# Patient Record
Sex: FEMALE | HISPANIC OR LATINO | Employment: UNEMPLOYED | URBAN - METROPOLITAN AREA
[De-identification: names, ages, dates, MRNs, and addresses within clinical notes are randomized per-mention and may not be internally consistent; named-entity substitution may affect disease eponyms.]

---

## 2019-01-25 ENCOUNTER — HOSPITAL ENCOUNTER (EMERGENCY)
Facility: HOSPITAL | Age: 49
Discharge: HOME | End: 2019-01-25
Attending: EMERGENCY MEDICINE | Admitting: EMERGENCY MEDICINE

## 2019-01-25 VITALS
HEART RATE: 74 BPM | WEIGHT: 179.01 LBS | DIASTOLIC BLOOD PRESSURE: 98 MMHG | RESPIRATION RATE: 18 BRPM | OXYGEN SATURATION: 100 % | TEMPERATURE: 97.9 F | SYSTOLIC BLOOD PRESSURE: 161 MMHG

## 2019-01-25 DIAGNOSIS — I10 BENIGN LABILE HYPERTENSION: ICD-10-CM

## 2019-01-25 DIAGNOSIS — F41.9 ANXIETY: Primary | ICD-10-CM

## 2019-01-25 DIAGNOSIS — Z91.19 MEDICALLY NONCOMPLIANT: ICD-10-CM

## 2019-01-25 LAB
ALBUMIN SERPL BCP-MCNC: 4 G/DL (ref 3–5.2)
ALP SERPL-CCNC: 71 U/L (ref 43–122)
ALT SERPL W P-5'-P-CCNC: 20 U/L (ref 9–52)
ANION GAP SERPL CALCULATED.3IONS-SCNC: 4 MMOL/L (ref 5–14)
AST SERPL W P-5'-P-CCNC: 22 U/L (ref 14–36)
ATRIAL RATE: 81 BPM
BASOPHILS # BLD AUTO: 0.1 THOUSANDS/ΜL (ref 0–0.1)
BASOPHILS NFR BLD AUTO: 1 % (ref 0–1)
BILIRUB SERPL-MCNC: 0.3 MG/DL
BUN SERPL-MCNC: 7 MG/DL (ref 5–25)
CALCIUM SERPL-MCNC: 9.5 MG/DL (ref 8.4–10.2)
CHLORIDE SERPL-SCNC: 102 MMOL/L (ref 97–108)
CO2 SERPL-SCNC: 29 MMOL/L (ref 22–30)
CREAT SERPL-MCNC: 0.54 MG/DL (ref 0.6–1.2)
EOSINOPHIL # BLD AUTO: 0.1 THOUSAND/ΜL (ref 0–0.4)
EOSINOPHIL NFR BLD AUTO: 1 % (ref 0–6)
ERYTHROCYTE [DISTWIDTH] IN BLOOD BY AUTOMATED COUNT: 13.9 %
GFR SERPL CREATININE-BSD FRML MDRD: 111 ML/MIN/1.73SQ M
GLUCOSE SERPL-MCNC: 118 MG/DL (ref 70–99)
HCT VFR BLD AUTO: 41.6 % (ref 36–46)
HGB BLD-MCNC: 13.6 G/DL (ref 12–16)
LYMPHOCYTES # BLD AUTO: 2.3 THOUSANDS/ΜL (ref 0.5–4)
LYMPHOCYTES NFR BLD AUTO: 24 % (ref 25–45)
MCH RBC QN AUTO: 27.6 PG (ref 26–34)
MCHC RBC AUTO-ENTMCNC: 32.6 G/DL (ref 31–36)
MCV RBC AUTO: 85 FL (ref 80–100)
MONOCYTES # BLD AUTO: 0.4 THOUSAND/ΜL (ref 0.2–0.9)
MONOCYTES NFR BLD AUTO: 4 % (ref 1–10)
NEUTROPHILS # BLD AUTO: 6.6 THOUSANDS/ΜL (ref 1.8–7.8)
NEUTS SEG NFR BLD AUTO: 70 % (ref 45–65)
P AXIS: 30 DEGREES
PLATELET # BLD AUTO: 359 THOUSANDS/UL (ref 150–450)
PMV BLD AUTO: 8.7 FL (ref 8.9–12.7)
POTASSIUM SERPL-SCNC: 3.6 MMOL/L (ref 3.6–5)
PR INTERVAL: 172 MS
PROT SERPL-MCNC: 7.3 G/DL (ref 5.9–8.4)
QRS AXIS: 8 DEGREES
QRSD INTERVAL: 88 MS
QT INTERVAL: 350 MS
QTC INTERVAL: 406 MS
RBC # BLD AUTO: 4.91 MILLION/UL (ref 4–5.2)
SODIUM SERPL-SCNC: 135 MMOL/L (ref 137–147)
T WAVE AXIS: 15 DEGREES
TROPONIN I SERPL-MCNC: <0.01 NG/ML (ref 0–0.03)
TSH SERPL DL<=0.05 MIU/L-ACNC: 2.92 UIU/ML (ref 0.47–4.68)
VENTRICULAR RATE: 81 BPM
WBC # BLD AUTO: 9.5 THOUSAND/UL (ref 4.5–11)

## 2019-01-25 PROCEDURE — 36415 COLL VENOUS BLD VENIPUNCTURE: CPT | Performed by: EMERGENCY MEDICINE

## 2019-01-25 PROCEDURE — 80053 COMPREHEN METABOLIC PANEL: CPT | Performed by: EMERGENCY MEDICINE

## 2019-01-25 PROCEDURE — 84484 ASSAY OF TROPONIN QUANT: CPT | Performed by: EMERGENCY MEDICINE

## 2019-01-25 PROCEDURE — 93010 ELECTROCARDIOGRAM REPORT: CPT | Performed by: INTERNAL MEDICINE

## 2019-01-25 PROCEDURE — 85025 COMPLETE CBC W/AUTO DIFF WBC: CPT | Performed by: EMERGENCY MEDICINE

## 2019-01-25 PROCEDURE — 93005 ELECTROCARDIOGRAM TRACING: CPT

## 2019-01-25 PROCEDURE — 99283 EMERGENCY DEPT VISIT LOW MDM: CPT

## 2019-01-25 PROCEDURE — 84443 ASSAY THYROID STIM HORMONE: CPT | Performed by: EMERGENCY MEDICINE

## 2019-01-25 RX ORDER — METOPROLOL TARTRATE 5 MG/5ML
5 INJECTION INTRAVENOUS ONCE
Status: DISCONTINUED | OUTPATIENT
Start: 2019-01-25 | End: 2019-01-25

## 2019-01-25 RX ORDER — LORAZEPAM 0.5 MG/1
0.5 TABLET ORAL ONCE
Status: COMPLETED | OUTPATIENT
Start: 2019-01-25 | End: 2019-01-25

## 2019-01-25 RX ORDER — LISINOPRIL 10 MG/1
10 TABLET ORAL ONCE
Status: COMPLETED | OUTPATIENT
Start: 2019-01-25 | End: 2019-01-25

## 2019-01-25 RX ORDER — LISINOPRIL 10 MG/1
10 TABLET ORAL DAILY
Qty: 14 TABLET | Refills: 0 | Status: SHIPPED | OUTPATIENT
Start: 2019-01-25

## 2019-01-25 RX ADMIN — LORAZEPAM 0.5 MG: 0.5 TABLET ORAL at 01:40

## 2019-01-25 RX ADMIN — LISINOPRIL 10 MG: 10 TABLET ORAL at 02:33
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
## 2019-01-25 NOTE — DISCHARGE INSTRUCTIONS
Ansiedad   LO QUE NECESITA SABER:   La ansiedad es don afección que provoca que usted se sienta extremadamente preocupado o nervioso  Los sentimientos son mena kindra que pueden causar problemas en rebecca actividades diarias o el sueño  La ansiedad puede desencadenarse por algo que teme o puede ocurrir sin don causa  El estrés familiar o laboral, fumar, la cafeína y el alcohol pueden aumentar hugo riesgo para sufrir ansiedad  Ciertos medicamentos o condiciones de [address] pueden aumentar hugo riesgo  La ansiedad puede convertirse en don afección de larga duración si no se controla ni se trata  INSTRUCCIONES SOBRE EL [facility]PITAL[facility]IA:   Llame al 911 si presenta:   · Usted tiene dolor de pecho, presión o pesadez que pueden llegar a propagarse a rebecca hombros, brazos, mandíbula, savi o espalda    · Usted siente deseos de lastimarse o lastimar a alguien más  Pregúntele a hugo Rogue Beady vitaminas y minerales son adecuados para usted  · Rebecca síntomas empeoran o no mejoran con el tratamiento  · Hugo ansiedad christina que realice rebecca actividades diarias  · Tiene nuevos síntomas desde hugo última consulta  · Usted tiene preguntas o inquietudes acerca de hugo condición o cuidado  Medicamentos:   · Medicamentos,  para ayudarle a sentirse más calmado y relajado y disminuir rebecca síntomas  · [place] rebecca medicamentos thea se le haya indicado  Consulte con hugo médico si usted jyoti que hugo medicamento no le está ayudando o si presenta efectos secundarios  Infórmele si es alérgico a algún medicamento  Mantenga don lista actualizada de los [place], las vitaminas y los productos herbales que otf  Incluya los siguientes datos de los medicamentos: cantidad, frecuencia y motivo de administración  Traiga con usted la lista o los envases de la píldoras a rebecca citas de seguimiento  Lleve la lista de los medicamentos con usted en andres de don emergencia    Programe don aicha con hugo médico dentro de 2 semanas o thea se le indique:  Anote alicia preguntas para que se acuerde de hacerlas tamela alicia visitas  Maneje la ansiedad:   · Hable con alguien sobre hugo ansiedad  Hugo médico puede sugerirle que reciba consejería  La terapia cognitiva conceptual puede ayudarlo a entender y cambiar la forma que usted reacciona a los eventos que provocan alicia síntomas  Usted podría sentirse más cómodo hablando con un familiar o amigo sobre hugo ansiedad  Elija a alguien que usted sepa que será comprensivo y alentador  · Aprenda a relajarse  Las [employer] thea el ejercicio, meditación o escuchar música pueden ayudarlo a relajarse  Pase tiempo con amigos, o gabe cosas que disfruta  · Practique la respiración profunda  La respiración profunda puede ayudarlo a relajarse cuando se sienta ansioso  Enfóquese en respirar lento y profundo varias veces al día, o tamela un ataque de ansiedad  Respire por la nariz y exhale por la boca  · [place] don rutina para dormir  El sueño regular puede ayudarlo a sentirse más tranquilo tamela el día  [place] a dormirse y levántese a la misma hora todos los días  No anjana televisión ni use el ordenador dany antes de [id number]  Hugo habitación debe ser confortable, oscura y silenciosa  · Consuma alimentos saludables y variados  Los alimentos saludables incluyen frutas, verduras, productos lácteos bajos en grasa, shante magras, pescado, panes integrales y frijoles cocidos  Los alimentos saludables pueden ayudarlo a sentir menos ansidedad y [employer]  · Realice actividad física con regularidad  El ejercicio puede ayudarlo a aumentar hugo nivel de [place]  El ejercicio también puede elevar hugo estado de ánimo y [place] a dormir mejor  Hugo médico puede ayudarle a crear un plan de ejercicios  · No fume  La nicotina y otros químicos en los cigarrillos y cigarros pueden aumentar la ansiedad  Pida información a hugo médico si usted actualmente fuma y necesita ayuda para dejar de fumar   Los cigarrillos electrónicos o tabaco sin humo todavía contienen nicotina  Consulte con farah médico antes de [employer]  · No consuma cafeína  La cafeína puede empeorar alicia síntomas  No consuma alimentos o bebidas que tengan el propósito de aumentar farah nivel de [place]  · Limite o no consuma bebidas alcohólicas  Pregúntele a farah médico si usted puede estee alcohol  Es posible que usted no pueda ingerir alcohol si otf ciertos medicamentos para la ansiedad o la depresión  Limite el consumo de alcohol a 1 trago por día si es kelly  Si usted es hombre, limite el consumo de alcohol a 2 tragos por día  Un trago equivale a 12 onzas de cerveza, 5 onzas de vino o 1 onza y ½ de licor  · No use drogas  Las drogas también pueden agravar la ansiedad  También pueden dificultar el manejo de la ansiedad  Hable con farah médico si usted consume drogas y necesita ayuda para dejarlas  © 2017 [address] [facility] Information is for End User's use only and may not be sold, redistributed or otherwise used for commercial purposes  All illustrations and images included in CareNotes® are the copyrighted property of A D A M , Inc  or Timothy Sullivan  Esta información es sólo para uso en educación  Farah intención no es darle un consejo médico sobre enfermedades o tratamientos  Colsulte con farah Rhys Nolvia farmacéutico antes de seguir cualquier régimen médico para saber si es seguro y efectivo para usted  Hipertensión   LO QUE NECESITA SABER:   La hipertensión es la presión arterial jesus manuel  La presión arterial es la fuerza que ejerce la connie contra las beth de las arterias  La presión arterial normal debería estar a menos de 120/80  La pre-hipertensión estaría entre 120/80 y 139/ 80  La presión arterial jesus manuel estaría a 140/90 o más jesus manuel  La hipertensión causa que farah presión arterial se eleve tanto que farah corazón se ve forzado a trabajar [employer] de lo normal  [place] puede dañar farah corazón   Puede controlar la hipertensión con un estilo de amarilys saludable o con medicamentos  La presión [place] a proteger alicia órganos thea hugo corazón, pulmones, cerebro, y riñones  INSTRUCCIONES SOBRE EL MARLENE HOSPITALARIA:   Richardame al 911 en andres de presentar lo siguiente:   · Usted tiene malestar en el pecho que se siente thea estrujamiento, presión, Alexandra Brilliant o dolor  · Usted se siente confundido o tiene dificultad para hablar  · Repentinamente se siente aturdido o con dificultad para respirar  · Usted tiene dolor o [employer] espalda, [place], [place], abdomen o Glenn Shree  Regrese a la nestor de emergencias si:   · Usted tiene un hubert dolor de manuel o pérdida de la visión  · Usted tiene debilidad en un brazo o en don pierna  Pregúntele a hugo Tenisha Gault vitaminas y minerales son adecuados para usted  · Usted se siente mareado, confundido, somnoliento o thea si se fuera a desmayar  · Usted se ha tomado hugo medicamento para la presión arterial rico hugo presión arterial todavía está más marlene de lo que le indicó hugo médico     · Usted tiene preguntas o inquietudes acerca de hugo condición o cuidado  Medicamentos:  Es posible que usted necesite alguno de los siguientes:  · Medicamento  podría usarse para ayudar a disminuir la presión arterial  Es posible que necesite más de un tipo de [place]  [place] el medicamento exactamente thea indicado  · Diuréticos  ayudan a eliminar el exceso de líquido que se acumula en el organismo  [place] contribuirá a bajar hugo presión arterial  Es posible que orine más seguido mientras otf chago medicamento  · Los medicamentos para el colesterol  ayudan a bajar los niveles de [place]  Un nivel bajo de colesterol ayuda a prevenir enfermedades cardíacas y facilita el control de la presión arterial      · [place] alicia medicamentos thea se le haya indicado  Consulte con hugo médico si usted jyoti que hugo medicamento no le está ayudando o si presenta efectos secundarios   Infórmele si es alérgico a cualquier medicamento  Mantenga don lista actualizada de los [place], las vitaminas y los productos herbales que otf  Incluya los siguientes datos de los medicamentos: cantidad, frecuencia y motivo de administración  Traiga con usted la lista o los envases de la píldoras a alicia citas de seguimiento  Lleve la lista de los medicamentos con usted en andres de don emergencia  Acuda a alicia consultas de control con hugo médico según le indicaron  Usted tendrá que regresar para que le revisen la presión arterial y para que le shayne otras pruebas de laboratorio  Anote alicia preguntas para que se acuerde de hacerlas tamela alicia visitas  Maneje hugo hipertensión:  Hable con hugo médico sobre las siguientes recomendaciones y otras formas de controlar la hipertensión:  · Revise hugo presión arterial en casa  Siéntese y descanse por 5 minutos antes de tomarse la presión arterial  Extienda hugo brazo y apóyelo en don superficie plana  Hugo brazo debe estar a la misma altura que hugo corazón  Siga las instrucciones que vienen con el monitor para la presión arterial o tensiómetro  Si es posible tome por lo menos 2 lecturas de la presión cada vez  Tómese la presión arterial por lo [place] al día a la misma hora todos los días, don en la mañana y la otra en la noche  Mantenga un registro de las lecturas de hugo presión arterial y llévelo consigo a alicia consultas  Pregúntele a hugo médico cuál debe ser hugo presión arterial            · Limite el sodio (la sal) thea se le haya indicado  Demasiado sodio puede afectar el equilibrio de líquidos  Revise las etiquetas para buscar alimentos bajos en sodio o sin sal agregada  Algunos alimentos bajos en sodio utilizan sales de potasio para añadir sabor  Demasiado potasio también puede causar problemas de [place]  Hugo médico le dirá qué cantidad de sodio y potasio es romero para el consumo en un día  Él puede recomendarle que limite el sodio a 2,300 mg al día             · Siga el plan de comidas recomendado por farah médico   Un dietista o médico puede darle más información sobre planes de bajo contenido de sodio o el plan de alimentación DASH (enfoques dietéticos para detener la hipertensión)  El plan DASH es bajo en sodio, grasas saturadas y grasa total  Es alto en potasio, calcio y [place]  · Ejercítese para mantener un peso saludable  Realice actividad física por lo menos 30 minutos al día, la mayoría de los días de la [place]  [place] ayudará a bajar farah presión arterial  Pregunte a farah médico acerca del mejor plan de ejercicio para usted  · [address] [facility] estrés  [place] podría ayudarlo a bajar farah presión arterial  Aprenda sobre formas de relajarse, thea respiración profunda o escuchar música  · Limite el consumo de alcohol  Las mujeres deberían limitar el consumo de alcohol a 1 bebida por día  Los hombres deberían limitar el consumo de alcohol a 2 tragos al día  Un trago equivale a 12 onzas de cerveza, 5 onzas de vino o 1 onza y ½ de licor  · No fume  La nicotina y otros químicos en los cigarrillos y cigarros pueden aumentar farah presión arterial y también pueden provocar daño al pulmón  Pida información a farah médico si usted actualmente fuma y necesita ayuda para dejar de fumar  Los cigarrillos electrónicos o tabaco sin humo todavía contienen nicotina  Consulte con farah médico antes de [employer]  · Controle cualquier otra condición médica que usted tenga  Algunas condiciones médicas thea la diabetes pueden aumentar farah riesgo de hipertensión  [address] farah médico y tómese alicia medicamentos según dichas instrucciones  © 2017 [address] Information is for End User's use only and may not be sold, redistributed or otherwise used for commercial purposes  All illustrations and images included in CareNotes® are the copyrighted property of A D A M , Inc  or Timothy Sullivan  Esta información es sólo para uso en educación   Farah intención no es darle un consejo médico sobre enfermedades o tratamientos  Colsulte con hugo Ladena Creed farmacéutico antes de seguir cualquier régimen médico para saber si es seguro y efectivo para usted

## 2021-07-09 ENCOUNTER — HOSPITAL ENCOUNTER (EMERGENCY)
Facility: HOSPITAL | Age: 51
Discharge: HOME | End: 2021-07-09
Attending: EMERGENCY MEDICINE
Payer: MEDICAID

## 2021-07-09 VITALS
OXYGEN SATURATION: 100 % | DIASTOLIC BLOOD PRESSURE: 80 MMHG | TEMPERATURE: 96 F | RESPIRATION RATE: 16 BRPM | HEART RATE: 84 BPM | SYSTOLIC BLOOD PRESSURE: 135 MMHG

## 2021-07-09 DIAGNOSIS — K52.9 GASTROENTERITIS: Primary | ICD-10-CM

## 2021-07-09 LAB
ALBUMIN SERPL BCP-MCNC: 4.3 G/DL (ref 3–5.2)
ALP SERPL-CCNC: 66 U/L (ref 43–122)
ALT SERPL W P-5'-P-CCNC: 18 U/L
ANION GAP SERPL CALCULATED.3IONS-SCNC: 13 MMOL/L (ref 5–14)
ANISOCYTOSIS BLD QL SMEAR: PRESENT
AST SERPL W P-5'-P-CCNC: 26 U/L (ref 14–36)
ATRIAL RATE: 69 BPM
BILIRUB SERPL-MCNC: 0.28 MG/DL
BUN SERPL-MCNC: 14 MG/DL (ref 5–25)
CALCIUM SERPL-MCNC: 9.6 MG/DL (ref 8.4–10.2)
CHLORIDE SERPL-SCNC: 103 MMOL/L (ref 97–108)
CO2 SERPL-SCNC: 25 MMOL/L (ref 22–30)
CREAT SERPL-MCNC: 0.73 MG/DL (ref 0.6–1.2)
ERYTHROCYTE [DISTWIDTH] IN BLOOD BY AUTOMATED COUNT: 16.8 %
ETHANOL SERPL-MCNC: <10 MG/DL (ref 0–10)
GFR SERPL CREATININE-BSD FRML MDRD: 95 ML/MIN/1.73SQ M
GLUCOSE SERPL-MCNC: 168 MG/DL (ref 70–99)
HCT VFR BLD AUTO: 33.5 % (ref 36–46)
HGB BLD-MCNC: 11 G/DL (ref 12–16)
LG PLATELETS BLD QL SMEAR: PRESENT
LIPASE SERPL-CCNC: 82 U/L (ref 23–300)
LYMPHOCYTES # BLD AUTO: 23 % (ref 25–45)
LYMPHOCYTES # BLD AUTO: 4.07 THOUSAND/UL (ref 0.5–4)
MCH RBC QN AUTO: 24 PG (ref 26–34)
MCHC RBC AUTO-ENTMCNC: 32.8 G/DL (ref 31–36)
MCV RBC AUTO: 73 FL (ref 80–100)
MICROCYTES BLD QL AUTO: PRESENT
MONOCYTES # BLD AUTO: 0.53 THOUSAND/UL (ref 0.2–0.9)
MONOCYTES NFR BLD AUTO: 3 % (ref 1–10)
NEUTS SEG # BLD: 13.1 THOUSAND/UL (ref 1.8–7.8)
NEUTS SEG NFR BLD AUTO: 74 %
P AXIS: 44 DEGREES
PLATELET # BLD AUTO: 390 THOUSANDS/UL (ref 150–450)
PLATELET BLD QL SMEAR: ADEQUATE
PMV BLD AUTO: 8.7 FL (ref 8.9–12.7)
POTASSIUM SERPL-SCNC: 3.5 MMOL/L (ref 3.6–5)
PR INTERVAL: 200 MS
PROT SERPL-MCNC: 7.5 G/DL (ref 5.9–8.4)
QRS AXIS: 21 DEGREES
QRSD INTERVAL: 94 MS
QT INTERVAL: 402 MS
QTC INTERVAL: 430 MS
RBC # BLD AUTO: 4.58 MILLION/UL (ref 4–5.2)
RBC MORPH BLD: ABNORMAL
SODIUM SERPL-SCNC: 141 MMOL/L (ref 137–147)
T WAVE AXIS: 23 DEGREES
TOTAL CELLS COUNTED SPEC: 100
TROPONIN I SERPL-MCNC: <0.01 NG/ML (ref 0–0.03)
VENTRICULAR RATE: 69 BPM
WBC # BLD AUTO: 17.7 THOUSAND/UL (ref 4.5–11)

## 2021-07-09 PROCEDURE — 84484 ASSAY OF TROPONIN QUANT: CPT | Performed by: PHYSICIAN ASSISTANT

## 2021-07-09 PROCEDURE — 93010 ELECTROCARDIOGRAM REPORT: CPT | Performed by: INTERNAL MEDICINE

## 2021-07-09 PROCEDURE — 99284 EMERGENCY DEPT VISIT MOD MDM: CPT | Performed by: PHYSICIAN ASSISTANT

## 2021-07-09 PROCEDURE — 36415 COLL VENOUS BLD VENIPUNCTURE: CPT | Performed by: PHYSICIAN ASSISTANT

## 2021-07-09 PROCEDURE — 93005 ELECTROCARDIOGRAM TRACING: CPT

## 2021-07-09 PROCEDURE — 85007 BL SMEAR W/DIFF WBC COUNT: CPT | Performed by: PHYSICIAN ASSISTANT

## 2021-07-09 PROCEDURE — 99284 EMERGENCY DEPT VISIT MOD MDM: CPT

## 2021-07-09 PROCEDURE — 85027 COMPLETE CBC AUTOMATED: CPT | Performed by: PHYSICIAN ASSISTANT

## 2021-07-09 PROCEDURE — 80053 COMPREHEN METABOLIC PANEL: CPT | Performed by: PHYSICIAN ASSISTANT

## 2021-07-09 PROCEDURE — 83690 ASSAY OF LIPASE: CPT | Performed by: PHYSICIAN ASSISTANT

## 2021-07-09 PROCEDURE — 96374 THER/PROPH/DIAG INJ IV PUSH: CPT

## 2021-07-09 PROCEDURE — 82077 ASSAY SPEC XCP UR&BREATH IA: CPT | Performed by: PHYSICIAN ASSISTANT

## 2021-07-09 PROCEDURE — 96361 HYDRATE IV INFUSION ADD-ON: CPT

## 2021-07-09 RX ORDER — ONDANSETRON 2 MG/ML
4 INJECTION INTRAMUSCULAR; INTRAVENOUS ONCE
Status: COMPLETED | OUTPATIENT
Start: 2021-07-09 | End: 2021-07-09

## 2021-07-09 RX ORDER — ONDANSETRON 4 MG/1
4 TABLET, ORALLY DISINTEGRATING ORAL EVERY 6 HOURS PRN
Qty: 9 TABLET | Refills: 0 | Status: SHIPPED | OUTPATIENT
Start: 2021-07-09

## 2021-07-09 RX ADMIN — SODIUM CHLORIDE 1000 ML: 0.9 INJECTION, SOLUTION INTRAVENOUS at 01:02

## 2021-07-09 RX ADMIN — ONDANSETRON 4 MG: 2 INJECTION INTRAMUSCULAR; INTRAVENOUS at 01:02

## 2021-07-09 NOTE — DISCHARGE INSTRUCTIONS
Take medications as directed  Follow a bland diet  Avoid spicy greasy or acidic foods   Return for new or worsening complaints

## 2021-07-09 NOTE — ED PROVIDER NOTES
History  Chief Complaint   Patient presents with    Vomiting     Onset this morning; feels "drunk" +episgastric and abd pain that radiates to back  62-year-old female with history of hypertension presents via EMS complaining of nausea vomiting abdominal pain and dizziness after eating dinner earlier tonight  Denies history of similar  Tells me she was eating pork and rice for dinner and has been vomiting since  History of prior appendectomy and is now complaining of epigastric pain  Has not taken anything prior to arrival   Tells me she feels like she is "drunk"  Denies any other complaints      History provided by:  Patient   used: No    Vomiting  Severity:  Moderate  Associated symptoms: abdominal pain    Associated symptoms: no arthralgias, no chills, no cough and no sore throat        Prior to Admission Medications   Prescriptions Last Dose Informant Patient Reported? Taking?   lisinopril (ZESTRIL) 10 mg tablet   No No   Sig: Take 1 tablet (10 mg total) by mouth daily      Facility-Administered Medications: None       Past Medical History:   Diagnosis Date    Hypertension        Past Surgical History:   Procedure Laterality Date    ABDOMINAL SURGERY       SECTION         History reviewed  No pertinent family history  I have reviewed and agree with the history as documented  E-Cigarette/Vaping     E-Cigarette/Vaping Substances     Social History     Tobacco Use    Smoking status: Never Smoker    Smokeless tobacco: Never Used   Substance Use Topics    Alcohol use: No    Drug use: No       Review of Systems   Constitutional: Negative  Negative for chills and fatigue  HENT: Negative for ear pain and sore throat  Eyes: Negative for photophobia and redness  Respiratory: Negative for apnea, cough and shortness of breath  Cardiovascular: Negative for chest pain  Gastrointestinal: Positive for abdominal pain, nausea and vomiting     Genitourinary: Negative for dysuria  Musculoskeletal: Negative for arthralgias, neck pain and neck stiffness  Skin: Negative for rash  Neurological: Negative for dizziness, tremors, syncope and weakness  Psychiatric/Behavioral: Negative for suicidal ideas  Physical Exam  Physical Exam  Constitutional:       General: She is not in acute distress  Appearance: She is well-developed  She is not ill-appearing, toxic-appearing or diaphoretic  Eyes:      Pupils: Pupils are equal, round, and reactive to light  Cardiovascular:      Rate and Rhythm: Normal rate and regular rhythm  Pulmonary:      Effort: Pulmonary effort is normal  No respiratory distress  Breath sounds: Normal breath sounds  Abdominal:      General: Bowel sounds are normal  There is no distension  Palpations: Abdomen is soft  Tenderness: There is abdominal tenderness (Epigastric )  There is no guarding  Musculoskeletal:         General: Normal range of motion  Cervical back: Normal range of motion and neck supple  Skin:     General: Skin is warm and dry  Neurological:      Mental Status: She is alert and oriented to person, place, and time           Vital Signs  ED Triage Vitals [07/09/21 0043]   Temperature Pulse Respirations Blood Pressure SpO2   (!) 96 °F (35 6 °C) 60 16 (!) 145/42 100 %      Temp Source Heart Rate Source Patient Position - Orthostatic VS BP Location FiO2 (%)   Tympanic Monitor Sitting Left arm --      Pain Score       Worst Possible Pain           Vitals:    07/09/21 0043   BP: (!) 145/42   Pulse: 60   Patient Position - Orthostatic VS: Sitting         Visual Acuity      ED Medications  Medications   ondansetron (ZOFRAN) injection 4 mg (4 mg Intravenous Given 7/9/21 0102)   sodium chloride 0 9 % bolus 1,000 mL (1,000 mL Intravenous New Bag 7/9/21 0102)       Diagnostic Studies  Results Reviewed     Procedure Component Value Units Date/Time    Manual Differential (Non Wam) [[id number]]  (Abnormal) Collected: 07/09/21 0104    Lab Status: Final result Specimen: Blood from Arm, Right Updated: 07/09/21 0140     Segmented % 74 %      Lymphocytes % 23 %      Monocytes % 3 %      Neutrophils Absolute 13 10 Thousand/uL      Lymphocytes Absolute 4 07 Thousand/uL      Monocytes Absolute 0 53 Thousand/uL      Total Counted 100     RBC Morphology abnormal     Anisocytosis Present     Microcytes Present     Platelet Estimate Adequate     Large Platelet Present    Troponin I [[id number]]  (Normal) Collected: 07/09/21 0104    Lab Status: Final result Specimen: Blood from Arm, Right Updated: 07/09/21 0138     Troponin I <0 01 ng/mL     Ethanol [[id number]]  (Normal) Collected: 07/09/21 0104    Lab Status: Final result Specimen: Blood from Arm, Right Updated: 07/09/21 0128     Ethanol Lvl <10 mg/dL     Lipase [[id number]]  (Normal) Collected: 07/09/21 0104    Lab Status: Final result Specimen: Blood from Arm, Right Updated: 07/09/21 0128     Lipase 82 u/L     Comprehensive metabolic panel [[id number]]  (Abnormal) Collected: 07/09/21 0104    Lab Status: Final result Specimen: Blood from Arm, Right Updated: 07/09/21 0127     Sodium 141 mmol/L      Potassium 3 5 mmol/L      Chloride 103 mmol/L      CO2 25 mmol/L      ANION GAP 13 mmol/L      BUN 14 mg/dL      Creatinine 0 73 mg/dL      Glucose 168 mg/dL      Calcium 9 6 mg/dL      AST 26 U/L      ALT 18 U/L      Alkaline Phosphatase 66 U/L      Total Protein 7 5 g/dL      Albumin 4 3 g/dL      Total Bilirubin 0 28 mg/dL      eGFR 95 ml/min/1 73sq m     Narrative:      [place] guidelines for Chronic Kidney Disease (CKD):     Stage 1 with normal or high GFR (GFR > 90 mL/min/1 73 square meters)    Stage 2 Mild CKD (GFR = 60-89 mL/min/1 73 square meters)    Stage 3A Moderate CKD (GFR = 45-59 mL/min/1 73 square meters)    Stage 3B Moderate CKD (GFR = 30-44 mL/min/1 73 square meters)    Stage 4 Severe CKD (GFR = 15-29 mL/min/1 73 square meters)    Stage 5 End Stage CKD (GFR <15 mL/min/1 73 square meters)  Note: GFR calculation is accurate only with a steady state creatinine    CBC and differential [[id number]]  (Abnormal) Collected: 07/09/21 0104    Lab Status: Final result Specimen: Blood from Arm, Right Updated: 07/09/21 0118     WBC 17 70 Thousand/uL      RBC 4 58 Million/uL      Hemoglobin 11 0 g/dL      Hematocrit 33 5 %      MCV 73 fL      MCH 24 0 pg      MCHC 32 8 g/dL      RDW 16 8 %      MPV 8 7 fL      Platelets 283 Thousands/uL     UA (URINE) with reflex to Scope [[id number]]     Lab Status: No result Specimen: Urine     POCT pregnancy, urine [[id number]]     Lab Status: No result                  No orders to display              Procedures  ECG 12 Lead Documentation Only    Date/Time: 7/9/2021 12:51 AM  Performed by: Karely Conklin PA-C  Authorized by: Karely Conklin PA-C     Indications / Diagnosis:  Epigastic pain   ECG reviewed by me, the ED Provider: yes    Patient location:  ED  Interpretation:     Interpretation: normal    Rate:     ECG rate:  69    ECG rate assessment: normal    Rhythm:     Rhythm: sinus rhythm    Ectopy:     Ectopy: none    QRS:     QRS axis:  Normal    QRS intervals:  Normal  Conduction:     Conduction: normal    ST segments:     ST segments:  Normal  T waves:     T waves: normal               ED Course  ED Course as of Jul 09 0224 Fri Jul 09, 2021 0218 On re evaluation pt had almost complete resolution of symptoms and would like to go home                                 SBIRT 22yo+      Most Recent Value   SBIRT (23 yo +)   In order to provide better care to our patients, we are screening all of our patients for alcohol and drug use  Would it be okay to ask you these screening questions? Yes Filed at: 07/09/2021 0115   Initial Alcohol Screen: US AUDIT-C    1  How often do you have a drink containing alcohol?  0 Filed at: 07/09/2021 0115   2  How many drinks containing alcohol do you have on a typical day you are drinking?    0 Filed at: 07/09/2021 0115   3b  FEMALE Any Age, or MALE 65+: How often do you have 4 or more drinks on one occassion? 0 Filed at: 07/09/2021 0115   Audit-C Score  0 Filed at: 07/09/2021 0115   ERASMO: How many times in the past year have you    Used an illegal drug or used a prescription medication for non-medical reasons? Never Filed at: 07/09/2021 0115                    MDM  Number of Diagnoses or Management Options  Gastroenteritis: new and does not require workup  Diagnosis management comments: Case discussed with attending  Patient presented with abdominal cramping nausea and vomiting after eating pulled pork all day  Patient had almost complete symptomatic resolution with Zofran  No longer had any abdominal tenderness more nausea and was requesting to go home  Laboratory evaluation without evidence of leukocytosis thought to be secondary to stress reaction from vomiting  Symptoms thought to be related to gastroenteritis versus acid reflux  Patient was given primary care follow-up discharge home with antiemetic educated on supportive care  Amount and/or Complexity of Data Reviewed  Clinical lab tests: ordered and reviewed    Risk of Complications, Morbidity, and/or Mortality  Presenting problems: moderate  Diagnostic procedures: moderate  Management options: moderate    Patient Progress  Patient progress: stable      Disposition  Final diagnoses:   Gastroenteritis     Time reflects when diagnosis was documented in both MDM as applicable and the Disposition within this note     Time User Action Codes Description Comment    7/9/2021  2:20 AM Carole Streeter Add [K52 9] Gastroenteritis       ED Disposition     ED Disposition Condition Date/Time Comment    Discharge Stable Fri Jul 9, 2021  2:20 AM Marquita Lloyd discharge to home/self care              Follow-up Information     Follow up With Specialties Details Why Contact Info Additional [address] [facility] Department Emergency Medicine Go to  If symptoms worsen [address] [address]  [address] [facility] Emergency [address] [facility] [facility] Family Medicine Call in 2 days As needed [address], [address] [facility] [address]  [address], [address], [address], [address]          Patient's Medications   Discharge Prescriptions    ONDANSETRON (ZOFRAN-ODT) 4 MG DISINTEGRATING TABLET    Take 1 tablet (4 mg total) by mouth every 6 (six) hours as needed for nausea or vomiting       Start Date: 7/9/2021  End Date: --       Order Dose: 4 mg       Quantity: 9 tablet    Refills: 0     No discharge procedures on file      PDMP Review     None          ED Provider  Electronically Signed by           Neftali Chen PA-C  07/09/21 5919